# Patient Record
Sex: FEMALE | Race: WHITE | Employment: FULL TIME | URBAN - METROPOLITAN AREA
[De-identification: names, ages, dates, MRNs, and addresses within clinical notes are randomized per-mention and may not be internally consistent; named-entity substitution may affect disease eponyms.]

---

## 2021-11-06 ENCOUNTER — HOSPITAL ENCOUNTER (EMERGENCY)
Age: 31
Discharge: HOME | End: 2021-11-06
Attending: STUDENT IN AN ORGANIZED HEALTH CARE EDUCATION/TRAINING PROGRAM
Payer: COMMERCIAL

## 2021-11-06 ENCOUNTER — APPOINTMENT (OUTPATIENT)
Dept: CT IMAGING | Age: 31
End: 2021-11-06
Attending: PHYSICIAN ASSISTANT
Payer: COMMERCIAL

## 2021-11-06 ENCOUNTER — HOSPITAL ENCOUNTER (EMERGENCY)
Dept: GENERAL RADIOLOGY | Age: 31
Discharge: HOME | End: 2021-11-06
Attending: PHYSICIAN ASSISTANT
Payer: COMMERCIAL

## 2021-11-06 VITALS
DIASTOLIC BLOOD PRESSURE: 113 MMHG | SYSTOLIC BLOOD PRESSURE: 167 MMHG | TEMPERATURE: 99.3 F | HEART RATE: 114 BPM | OXYGEN SATURATION: 98 % | RESPIRATION RATE: 18 BRPM
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
## 2021-11-06 DIAGNOSIS — R19.00 ABDOMINAL MASS, UNSPECIFIED ABDOMINAL LOCATION: ICD-10-CM

## 2021-11-06 DIAGNOSIS — R10.84 ABDOMINAL PAIN, GENERALIZED: ICD-10-CM

## 2021-11-06 DIAGNOSIS — M79.646 THUMB PAIN, UNSPECIFIED LATERALITY: ICD-10-CM

## 2021-11-06 LAB — HCG UR QL: NEGATIVE

## 2021-11-06 PROCEDURE — 99284 EMERGENCY DEPT VISIT MOD MDM: CPT

## 2021-11-06 PROCEDURE — 74176 CT ABD & PELVIS W/O CONTRAST: CPT

## 2021-11-06 PROCEDURE — 73140 X-RAY EXAM OF FINGER(S): CPT

## 2021-11-06 PROCEDURE — 81025 URINE PREGNANCY TEST: CPT

## 2021-11-06 RX ORDER — IBUPROFEN 800 MG/1
800 TABLET ORAL
Qty: 20 TABLET | Refills: 0 | Status: SHIPPED | OUTPATIENT
Start: 2021-11-06 | End: 2021-11-13

## 2021-11-06 RX ORDER — ACETAMINOPHEN 325 MG/1
650 TABLET ORAL
Qty: 20 TABLET | Refills: 0 | Status: SHIPPED | OUTPATIENT
Start: 2021-11-06

## 2021-11-06 RX ORDER — CYCLOBENZAPRINE HCL 10 MG
10 TABLET ORAL
Qty: 12 TABLET | Refills: 0 | Status: SHIPPED | OUTPATIENT
Start: 2021-11-06
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
## 2021-11-06 NOTE — DISCHARGE INSTRUCTIONS
There is a mass located in your abdomen; it does not appear suspicious- but your need to follow up with general surgery   Apply ice to your hands to help with pain; 2 hours on, then off   Return to the ER for any worsening or worrisome symptoms

## 2021-11-06 NOTE — Clinical Note
[address] 
[address] [facility] [address] 
[phone] Work/School Note Date: 11/6/2021 To Whom It May concern: 
 
James Laguerre was seen and treated today in the emergency room by the following provider(s): 
Attending Provider: Jacoby Sen MD 
Physician Assistant: Fernando Grey NP. James Laguerre is excused from work/school on 11/6/2021 through 11/9/2021. She is medically clear to return to work/school on 11/10/2021. Sincerely, Jeane Davalos NP

## 2021-11-06 NOTE — Clinical Note
[address] 
[address] [facility] [address] [phone] Work/School Note Date: 11/6/2021 To Whom It May concern: 
 
Lashay Fortune was seen and treated today in the emergency room by the following provider(s): 
Attending Provider: Tianna Gunderson MD 
Physician Assistant: Tee Hayward NP. Lashay Fortune is excused from work/school on 11/6/2021 through 11/9/2021. She is medically clear to return to work/school on 11/10/2021. Sincerely, Radha Laureano NP
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
## 2023-05-15 RX ORDER — ACETAMINOPHEN 325 MG/1
650 TABLET ORAL EVERY 4 HOURS PRN
COMMUNITY
Start: 2021-11-06

## 2023-05-15 RX ORDER — CYCLOBENZAPRINE HCL 10 MG
10 TABLET ORAL 3 TIMES DAILY PRN
COMMUNITY
Start: 2021-11-06

## 2024-08-06 ENCOUNTER — TELEPHONE (OUTPATIENT)
Age: 34
End: 2024-08-06

## 2024-08-06 NOTE — PROGRESS NOTES
HISTORY OF PRESENT ILLNESS  Tika Maurer is a 33 y.o. female     HPI NEW patient consult referred by Dr. Marta KEE for RIGHT breast mass found incidentally on a CT scan.       Family History:  Father - prostate and bladder cancer    No breast imaging priors.     Past Medical History:   Diagnosis Date    Psychiatric disorder     depression     No past surgical history on file.  Social History     Socioeconomic History    Marital status: Single     Spouse name: Not on file    Number of children: Not on file    Years of education: Not on file    Highest education level: Not on file   Occupational History    Not on file   Tobacco Use    Smoking status: Never    Smokeless tobacco: Never   Substance and Sexual Activity    Alcohol use: Not Currently    Drug use: Never    Sexual activity: Not on file   Other Topics Concern    Not on file   Social History Narrative    Not on file     Social Determinants of Health     Financial Resource Strain: Not on file   Food Insecurity: Not on file   Transportation Needs: Not on file   Physical Activity: Not on file   Stress: Not on file   Social Connections: Not on file   Intimate Partner Violence: Not on file   Housing Stability: Not on file     OB History    No obstetric history on file.      Obstetric Comments   Menarche 13, LMP 7/2024, # of children 0, age of 1st delivery -, Hysterectomy/oophorectomy no/no, Breast bx no, history of breast feeding no, BCP yes, Hormone therapy no               Current Outpatient Medications:     acetaminophen (TYLENOL) 325 MG tablet, Take 2 tablets by mouth every 4 hours as needed, Disp: , Rfl:     cyclobenzaprine (FLEXERIL) 10 MG tablet, Take 1 tablet by mouth 3 times daily as needed, Disp: , Rfl:   Not on File   Review of Systems   All other systems reviewed and are negative.        Physical Exam  Vitals and nursing note reviewed.   Chest:   Breasts:     Right: No swelling, bleeding, inverted nipple, mass, nipple discharge, skin change or

## 2024-08-06 NOTE — TELEPHONE ENCOUNTER
Called patient to ask if she had her CT scan mentioned in her [facility] notes for her appointment with Dr. Culver.  Left a voicemail.

## 2024-08-07 ENCOUNTER — OFFICE VISIT (OUTPATIENT)
Age: 34
End: 2024-08-07
Payer: COMMERCIAL

## 2024-08-07 VITALS — HEIGHT: 67 IN | WEIGHT: 291 LBS | BODY MASS INDEX: 45.67 KG/M2

## 2024-08-07 DIAGNOSIS — N63.10 MASS OF RIGHT BREAST, UNSPECIFIED QUADRANT: Primary | ICD-10-CM

## 2024-08-07 PROCEDURE — 99203 OFFICE O/P NEW LOW 30 MIN: CPT | Performed by: SURGERY

## 2024-09-09 ENCOUNTER — HOSPITAL ENCOUNTER (OUTPATIENT)
Facility: HOSPITAL | Age: 34
Discharge: HOME | End: 2024-09-12
Attending: SURGERY
Payer: COMMERCIAL

## 2024-09-09 VITALS — WEIGHT: 293 LBS | BODY MASS INDEX: 45.99 KG/M2 | HEIGHT: 67 IN

## 2024-09-09 DIAGNOSIS — N63.10 MASS OF RIGHT BREAST, UNSPECIFIED QUADRANT: ICD-10-CM

## 2024-09-09 PROCEDURE — G0279 TOMOSYNTHESIS, MAMMO: HCPCS

## 2024-09-23 ENCOUNTER — TELEPHONE (OUTPATIENT)
Dept: PRIMARY CARE CLINIC | Facility: CLINIC | Age: 34
End: 2024-09-23

## 2024-10-25 ENCOUNTER — HOSPITAL ENCOUNTER (EMERGENCY)
Facility: HOSPITAL | Age: 34
Discharge: HOME | End: 2024-10-25
Attending: EMERGENCY MEDICINE
Payer: COMMERCIAL

## 2024-10-25 ENCOUNTER — APPOINTMENT (OUTPATIENT)
Facility: HOSPITAL | Age: 34
End: 2024-10-25
Payer: COMMERCIAL

## 2024-10-25 VITALS
SYSTOLIC BLOOD PRESSURE: 165 MMHG | DIASTOLIC BLOOD PRESSURE: 88 MMHG | HEART RATE: 103 BPM | HEIGHT: 67 IN | BODY MASS INDEX: 45.99 KG/M2 | RESPIRATION RATE: 20 BRPM | OXYGEN SATURATION: 100 % | WEIGHT: 293 LBS | TEMPERATURE: 98.9 F

## 2024-10-25 DIAGNOSIS — S93.402A SPRAIN OF LEFT ANKLE, UNSPECIFIED LIGAMENT, INITIAL ENCOUNTER: Primary | ICD-10-CM

## 2024-10-25 PROCEDURE — 73630 X-RAY EXAM OF FOOT: CPT

## 2024-10-25 PROCEDURE — 6370000000 HC RX 637 (ALT 250 FOR IP)

## 2024-10-25 PROCEDURE — 99283 EMERGENCY DEPT VISIT LOW MDM: CPT

## 2024-10-25 PROCEDURE — 73610 X-RAY EXAM OF ANKLE: CPT

## 2024-10-25 RX ORDER — IBUPROFEN 400 MG/1
800 TABLET, FILM COATED ORAL
Status: COMPLETED | OUTPATIENT
Start: 2024-10-25 | End: 2024-10-25

## 2024-10-25 RX ADMIN — IBUPROFEN 800 MG: 400 TABLET, FILM COATED ORAL at 21:34

## 2024-10-25 ASSESSMENT — PAIN DESCRIPTION - LOCATION
LOCATION: ANKLE
LOCATION: ANKLE

## 2024-10-25 ASSESSMENT — PAIN SCALES - GENERAL
PAINLEVEL_OUTOF10: 8
PAINLEVEL_OUTOF10: 8

## 2024-10-25 ASSESSMENT — PAIN DESCRIPTION - PAIN TYPE: TYPE: ACUTE PAIN

## 2024-10-25 ASSESSMENT — PAIN - FUNCTIONAL ASSESSMENT
PAIN_FUNCTIONAL_ASSESSMENT: 0-10
PAIN_FUNCTIONAL_ASSESSMENT: PREVENTS OR INTERFERES WITH MANY ACTIVE NOT PASSIVE ACTIVITIES

## 2024-10-25 ASSESSMENT — PAIN DESCRIPTION - ONSET: ONSET: SUDDEN

## 2024-10-25 ASSESSMENT — PAIN DESCRIPTION - FREQUENCY: FREQUENCY: CONTINUOUS

## 2024-10-25 ASSESSMENT — PAIN DESCRIPTION - ORIENTATION
ORIENTATION: LEFT
ORIENTATION: LEFT

## 2024-10-25 ASSESSMENT — PAIN DESCRIPTION - DESCRIPTORS: DESCRIPTORS: SHARP

## 2024-10-26 NOTE — ED NOTES
Pt educated on use of crutches and walking boot. Pt able to teach back and verbalize understanding.

## 2024-10-26 NOTE — ED TRIAGE NOTES
Patient ambulatory with a limp with CC of L ankle injury. Stated she was at work when she tripped over a curb and heard her ankle pop.

## 2024-10-26 NOTE — ED PROVIDER NOTES
(MIN 3 VIEWS)   Final Result    No acute bony abnormalities.                                                   Electronically signed by ASTRID Nixon      XR FOOT LEFT (MIN 3 VIEWS)   Final Result    No acute bony abnormalities.                                                   Electronically signed by ASTRID Nixon           LABS:  Labs Reviewed - No data to display    All other labs were within normal range or not returned as of this dictation.    EMERGENCY DEPARTMENT COURSE and DIFFERENTIAL DIAGNOSIS/MDM:   Vitals:    Vitals:    10/25/24 2031 10/25/24 2134 10/25/24 2151 10/25/24 2156   BP: (!) 137/103   (!) 165/88   Pulse: (!) 135  (!) 103    Resp: 22   20   Temp: 98.9 °F (37.2 °C)      TempSrc: Oral      SpO2: 97%   100%   Weight:  133.8 kg (295 lb)     Height:  1.702 m (5' 7\")             Medical Decision Making  Patient presents with left ankle pain after injury.  On exam patient has tenderness and swelling over the lateral malleolus.  She also has tenderness over the lateral aspect of the foot.  She is able to bear weight but reports significant pain when doing so.  X-ray imaging showed no acute abnormality. Given history, exam and workup patient likely has ankle sprain. I have low suspicion for fracture, dislocation, significant ligamentous injury, septic arthritis, gout flare, new autoimmune arthropathy, or gonococcal arthropathy.  Patient placed in walking boot.  She is unable to ambulate in walking boot.  Patient given crutches and instructed on use.  Patient instructed to follow-up closely with orthopedics.  Discussed pain control with ibuprofen or Tylenol as needed as well as ice and elevation.  Instructed her to remain in the walking boot until she sees orthopedics.  She verbalized understanding.  Strict return precautions given.  Patient stable at time of discharge home.    Discussed my clinical impression(s), any labs and/or radiology results with the patient. I answered any questions and addressed any

## 2024-10-26 NOTE — DISCHARGE INSTRUCTIONS
You were seen today in the emergency department for an ankle injury.  X-ray imaging of your foot and ankle showed no fracture.  You likely have an ankle sprain.  You should remain in the walking boot until you follow-up with orthopedics.  Call the number above to schedule an appointment.  You should elevate and ice the ankle when possible.  Take ibuprofen or Tylenol as needed for pain.

## 2024-12-28 ENCOUNTER — HOSPITAL ENCOUNTER (EMERGENCY)
Facility: HOSPITAL | Age: 34
Discharge: HOME | End: 2024-12-28
Attending: EMERGENCY MEDICINE
Payer: COMMERCIAL

## 2024-12-28 ENCOUNTER — APPOINTMENT (OUTPATIENT)
Facility: HOSPITAL | Age: 34
End: 2024-12-28
Payer: COMMERCIAL

## 2024-12-28 VITALS
WEIGHT: 283.29 LBS | HEIGHT: 67 IN | SYSTOLIC BLOOD PRESSURE: 129 MMHG | HEART RATE: 66 BPM | RESPIRATION RATE: 18 BRPM | OXYGEN SATURATION: 94 % | BODY MASS INDEX: 44.46 KG/M2 | TEMPERATURE: 98.9 F | DIASTOLIC BLOOD PRESSURE: 80 MMHG

## 2024-12-28 DIAGNOSIS — J18.9 PNEUMONIA OF RIGHT UPPER LOBE DUE TO INFECTIOUS ORGANISM: Primary | ICD-10-CM

## 2024-12-28 LAB
ALBUMIN SERPL-MCNC: 3.8 G/DL (ref 3.5–5)
ALBUMIN/GLOB SERPL: 0.8 (ref 1.1–2.2)
ALP SERPL-CCNC: 78 U/L (ref 45–117)
ALT SERPL-CCNC: 25 U/L (ref 12–78)
ANION GAP SERPL CALC-SCNC: 6 MMOL/L (ref 2–12)
AST SERPL-CCNC: 16 U/L (ref 15–37)
BASOPHILS # BLD: 0 K/UL (ref 0–0.1)
BASOPHILS NFR BLD: 0 % (ref 0–1)
BILIRUB SERPL-MCNC: 0.4 MG/DL (ref 0.2–1)
BUN SERPL-MCNC: 22 MG/DL (ref 6–20)
BUN/CREAT SERPL: 29 (ref 12–20)
CALCIUM SERPL-MCNC: 9.9 MG/DL (ref 8.5–10.1)
CHLORIDE SERPL-SCNC: 105 MMOL/L (ref 97–108)
CO2 SERPL-SCNC: 25 MMOL/L (ref 21–32)
COMMENT:: NORMAL
CREAT SERPL-MCNC: 0.76 MG/DL (ref 0.55–1.02)
DIFFERENTIAL METHOD BLD: ABNORMAL
EOSINOPHIL # BLD: 0 K/UL (ref 0–0.4)
EOSINOPHIL NFR BLD: 0 % (ref 0–7)
ERYTHROCYTE [DISTWIDTH] IN BLOOD BY AUTOMATED COUNT: 13.1 % (ref 11.5–14.5)
GLOBULIN SER CALC-MCNC: 4.5 G/DL (ref 2–4)
GLUCOSE SERPL-MCNC: 125 MG/DL (ref 65–100)
HCT VFR BLD AUTO: 38.2 % (ref 35–47)
HGB BLD-MCNC: 12.5 G/DL (ref 11.5–16)
IMM GRANULOCYTES # BLD AUTO: 0.1 K/UL (ref 0–0.04)
IMM GRANULOCYTES NFR BLD AUTO: 1 % (ref 0–0.5)
LYMPHOCYTES # BLD: 1.3 K/UL (ref 0.8–3.5)
LYMPHOCYTES NFR BLD: 11 % (ref 12–49)
MCH RBC QN AUTO: 26.8 PG (ref 26–34)
MCHC RBC AUTO-ENTMCNC: 32.7 G/DL (ref 30–36.5)
MCV RBC AUTO: 82 FL (ref 80–99)
MONOCYTES # BLD: 0.6 K/UL (ref 0–1)
MONOCYTES NFR BLD: 5 % (ref 5–13)
NEUTS SEG # BLD: 9.6 K/UL (ref 1.8–8)
NEUTS SEG NFR BLD: 83 % (ref 32–75)
NRBC # BLD: 0 K/UL (ref 0–0.01)
NRBC BLD-RTO: 0 PER 100 WBC
PLATELET # BLD AUTO: 458 K/UL (ref 150–400)
PMV BLD AUTO: 8.6 FL (ref 8.9–12.9)
POTASSIUM SERPL-SCNC: 4 MMOL/L (ref 3.5–5.1)
PROT SERPL-MCNC: 8.3 G/DL (ref 6.4–8.2)
RBC # BLD AUTO: 4.66 M/UL (ref 3.8–5.2)
SODIUM SERPL-SCNC: 136 MMOL/L (ref 136–145)
SPECIMEN HOLD: NORMAL
WBC # BLD AUTO: 11.6 K/UL (ref 3.6–11)

## 2024-12-28 PROCEDURE — 80053 COMPREHEN METABOLIC PANEL: CPT

## 2024-12-28 PROCEDURE — 99285 EMERGENCY DEPT VISIT HI MDM: CPT

## 2024-12-28 PROCEDURE — 36415 COLL VENOUS BLD VENIPUNCTURE: CPT

## 2024-12-28 PROCEDURE — 71046 X-RAY EXAM CHEST 2 VIEWS: CPT

## 2024-12-28 PROCEDURE — 93005 ELECTROCARDIOGRAM TRACING: CPT | Performed by: PHYSICIAN ASSISTANT

## 2024-12-28 PROCEDURE — 85025 COMPLETE CBC W/AUTO DIFF WBC: CPT

## 2024-12-28 ASSESSMENT — ENCOUNTER SYMPTOMS
COUGH: 1
SHORTNESS OF BREATH: 1

## 2024-12-28 ASSESSMENT — PAIN - FUNCTIONAL ASSESSMENT: PAIN_FUNCTIONAL_ASSESSMENT: NONE - DENIES PAIN

## 2024-12-28 NOTE — ED TRIAGE NOTES
Patient ambulatory through triage with complaints of sob for a few days. Was seen by an MD in [place] where they diagnosed her with bilateral lower lobe pneumonia. She was cleared to come back to [facility] so she flew back and was called today stating that it appeared she has a right upper lobe pneumonia.

## 2024-12-28 NOTE — ED PROVIDER NOTES
[facility] EMERGENCY DEP  EMERGENCY DEPARTMENT ENCOUNTER      Pt Name: Tika Maurer  MRN: [id number]  Birthdate 1990  Date of evaluation: 12/28/2024  Provider: Jose Brown PA-C    CHIEF COMPLAINT       Chief Complaint   Patient presents with    Shortness of Breath         HISTORY OF PRESENT ILLNESS   (Location/Symptom, Timing/Onset, Context/Setting, Quality, Duration, Modifying Factors, Severity)  Note limiting factors.   34-year-old F presenting to emergency department for concern of right upper lung pneumothorax.  Patient reports that she was seen in urgent care 2 days ago for shortness of breath and cough.  X-ray revealed pneumonia.  Patient has been taking doxycycline and prednisone since that time.  Patient reports that shortness of breath was worse 2 days ago and that she is actually feeling a lot better at this time, but due to receiving call from urgent care for radiology read concerning for right upper lung pneumothorax she is presenting to emergency department for further evaluation.  Patient recent travel from [place] was a short flight about 1 hour.            Review of External Medical Records:     Nursing Notes were reviewed.    REVIEW OF SYSTEMS    (2-9 systems for level 4, 10 or more for level 5)     Review of Systems   Respiratory:  Positive for cough and shortness of breath.    All other systems reviewed and are negative.      Except as noted above the remainder of the review of systems was reviewed and negative.       PAST MEDICAL HISTORY     Past Medical History:   Diagnosis Date    Psychiatric disorder     depression         SURGICAL HISTORY     No past surgical history on file.      CURRENT MEDICATIONS       Previous Medications    ACETAMINOPHEN (TYLENOL) 325 MG TABLET    Take 2 tablets by mouth every 4 hours as needed    CYCLOBENZAPRINE (FLEXERIL) 10 MG TABLET    Take 1 tablet by mouth 3 times daily as needed       ALLERGIES     Patient has no known allergies.    FAMILY HISTORY

## 2024-12-28 NOTE — DISCHARGE INSTRUCTIONS
Continue current medication regimen.  Take medication as prescribed.  Return immediately if any new or worsening symptoms.  Thank you for allowing us to be a part of your care.

## 2024-12-29 LAB
EKG ATRIAL RATE: 80 BPM
EKG DIAGNOSIS: NORMAL
EKG P AXIS: 53 DEGREES
EKG P-R INTERVAL: 148 MS
EKG Q-T INTERVAL: 348 MS
EKG QRS DURATION: 82 MS
EKG QTC CALCULATION (BAZETT): 401 MS
EKG R AXIS: 29 DEGREES
EKG T AXIS: 33 DEGREES
EKG VENTRICULAR RATE: 80 BPM

## 2024-12-29 PROCEDURE — 93010 ELECTROCARDIOGRAM REPORT: CPT | Performed by: INTERNAL MEDICINE
